# Patient Record
Sex: FEMALE | Employment: FULL TIME | URBAN - METROPOLITAN AREA
[De-identification: names, ages, dates, MRNs, and addresses within clinical notes are randomized per-mention and may not be internally consistent; named-entity substitution may affect disease eponyms.]

---

## 2021-07-13 ENCOUNTER — IMMUNIZATION (OUTPATIENT)
Dept: NURSING | Facility: CLINIC | Age: 41
End: 2021-07-13
Payer: COMMERCIAL

## 2021-07-13 PROCEDURE — 0001A PR COVID VAC PFIZER DIL RECON 30 MCG/0.3 ML IM: CPT

## 2021-07-13 PROCEDURE — 91300 PR COVID VAC PFIZER DIL RECON 30 MCG/0.3 ML IM: CPT

## 2021-08-03 ENCOUNTER — IMMUNIZATION (OUTPATIENT)
Dept: NURSING | Facility: CLINIC | Age: 41
End: 2021-08-03
Attending: FAMILY MEDICINE
Payer: COMMERCIAL

## 2021-08-03 PROCEDURE — 0002A PR COVID VAC PFIZER DIL RECON 30 MCG/0.3 ML IM: CPT

## 2021-08-03 PROCEDURE — 91300 PR COVID VAC PFIZER DIL RECON 30 MCG/0.3 ML IM: CPT

## 2022-05-12 ENCOUNTER — TRANSFERRED RECORDS (OUTPATIENT)
Dept: MULTI SPECIALTY CLINIC | Facility: CLINIC | Age: 42
End: 2022-05-12

## 2022-05-12 LAB — PAP SMEAR - HIM PATIENT REPORTED: NORMAL

## 2024-05-22 ENCOUNTER — LAB REQUISITION (OUTPATIENT)
Dept: LAB | Facility: CLINIC | Age: 44
End: 2024-05-22

## 2024-05-22 ENCOUNTER — HOSPITAL ENCOUNTER (OUTPATIENT)
Facility: CLINIC | Age: 44
Discharge: HOME | End: 2024-05-22
Admitting: PHYSICIAN ASSISTANT
Payer: COMMERCIAL

## 2024-05-22 DIAGNOSIS — B37.32 CHRONIC CANDIDIASIS OF VULVA AND VAGINA: ICD-10-CM

## 2024-05-22 DIAGNOSIS — Z13.1 ENCOUNTER FOR SCREENING FOR DIABETES MELLITUS: ICD-10-CM

## 2024-05-22 LAB — HBA1C MFR BLD: 5.6 %

## 2024-05-22 PROCEDURE — 87563 M. GENITALIUM AMP PROBE: CPT | Performed by: PHYSICIAN ASSISTANT

## 2024-05-22 PROCEDURE — 87102 FUNGUS ISOLATION CULTURE: CPT | Mod: ORL | Performed by: PHYSICIAN ASSISTANT

## 2024-05-22 PROCEDURE — 83036 HEMOGLOBIN GLYCOSYLATED A1C: CPT | Performed by: PHYSICIAN ASSISTANT

## 2024-05-25 LAB
M GENITALIUM DNA SPEC QL NAA+PROBE: NOT DETECTED
M HOMINIS DNA SPEC QL NAA+PROBE: DETECTED
U PARVUM DNA SPEC QL NAA+PROBE: DETECTED
U UREALYTICUM DNA SPEC QL NAA+PROBE: NOT DETECTED

## 2024-05-26 LAB — BACTERIA SPEC CULT: NO GROWTH

## 2024-06-18 ENCOUNTER — OFFICE VISIT (OUTPATIENT)
Dept: FAMILY MEDICINE | Facility: CLINIC | Age: 44
End: 2024-06-18
Payer: COMMERCIAL

## 2024-06-18 VITALS
SYSTOLIC BLOOD PRESSURE: 138 MMHG | TEMPERATURE: 97.2 F | DIASTOLIC BLOOD PRESSURE: 88 MMHG | OXYGEN SATURATION: 99 % | HEIGHT: 66 IN | WEIGHT: 161.4 LBS | BODY MASS INDEX: 25.94 KG/M2 | RESPIRATION RATE: 16 BRPM | HEART RATE: 87 BPM

## 2024-06-18 DIAGNOSIS — L03.317 CELLULITIS OF BUTTOCK: Primary | ICD-10-CM

## 2024-06-18 DIAGNOSIS — L50.9 URTICARIA: ICD-10-CM

## 2024-06-18 PROCEDURE — 99203 OFFICE O/P NEW LOW 30 MIN: CPT | Performed by: PHYSICIAN ASSISTANT

## 2024-06-18 RX ORDER — PREDNISONE 5 MG/1
TABLET ORAL
Qty: 42 TABLET | Refills: 0 | Status: SHIPPED | OUTPATIENT
Start: 2024-06-18

## 2024-06-18 RX ORDER — TRIAMCINOLONE ACETONIDE 1 MG/G
CREAM TOPICAL 2 TIMES DAILY
Qty: 80 G | Refills: 0 | Status: SHIPPED | OUTPATIENT
Start: 2024-06-18

## 2024-06-18 RX ORDER — CEPHALEXIN 500 MG/1
500 CAPSULE ORAL 3 TIMES DAILY
Qty: 30 CAPSULE | Refills: 0 | Status: SHIPPED | OUTPATIENT
Start: 2024-06-18 | End: 2024-06-28

## 2024-06-18 RX ORDER — CETIRIZINE HYDROCHLORIDE 10 MG/1
10 TABLET ORAL DAILY
Qty: 30 TABLET | Refills: 0 | Status: SHIPPED | OUTPATIENT
Start: 2024-06-18

## 2024-06-18 NOTE — PROGRESS NOTES
"  Assessment & Plan   Problem List Items Addressed This Visit    None  Visit Diagnoses       Cellulitis of buttock    -  Primary    Relevant Medications    cephALEXin (KEFLEX) 500 MG capsule    predniSONE (DELTASONE) 5 MG tablet    Urticaria        Relevant Medications    predniSONE (DELTASONE) 5 MG tablet    triamcinolone (KENALOG) 0.1 % external cream    cetirizine (ZYRTEC) 10 MG tablet        Cellulitis at the insect bite sites  Keflex 500 mg three times a day for 10 days    Urticaria- most likely systemic reaction to insect bites, severe excoriation of the area and cellulitis    Prednisone taper - see order  Triamcinolone cream apply twice a day to the areas aof itchy hives  Zyrtec 10 mg daily for 2-4 weeks   May take benadryl at bedtime as needed     BMI  Estimated body mass index is 26.05 kg/m  as calculated from the following:    Height as of this encounter: 1.676 m (5' 6\").    Weight as of this encounter: 73.2 kg (161 lb 6.4 oz).         Claudio Marrero is a 44 year old, presenting for the following health issues:  Cellulitis (Or bug bite upper thigh or buttock area )        6/18/2024     9:13 AM   Additional Questions   Roomed by aman     Via the Health Maintenance questionnaire, the patient has reported the following services have been completed -Cervical Cancer Screening: [facility] 2022-05-12, this information has been sent to the abstraction team.  History of Present Illness       Reason for visit:  Insect bite that looks like cellulitis  Symptom onset:  3-7 days ago  Symptoms include:  Insecr bites rash redness to skin pruritis  Symptom intensity:  Severe  Symptom progression:  Worsening  Had these symptoms before:  No  What makes it worse:  Seem to be more itchy as time progresses  What makes it better:  Cold press and itching    She eats 2-3 servings of fruits and vegetables daily.She consumes 0 sweetened beverage(s) daily.She exercises with enough effort to increase her heart rate 20 to 29 " "minutes per day.  She exercises with enough effort to increase her heart rate 5 days per week.   She is taking medications regularly.         Concern - insect bites that got red and painful  Onset: 6 days ago got 4-10 bites  Description: patient was scratching very hard, broke the skin, then 3 days ago started to develop severe surrounding redness and soreness  Intensity: moderate  Progression of Symptoms:  worsening  Accompanying Signs & Symptoms: 24 hours ago started to get hives all over the body, very itchy - bumps and blisters on both arms, legs, and torso  Previous history of similar problem: was in nature biking and also found mosquito at home  Precipitating factors:        Worsened by: itching  Alleviating factors:        Improved by: none  Therapies tried and outcome: calamine lotion        Review of Systems  Constitutional, HEENT, cardiovascular, pulmonary, gi and gu systems are negative, except as otherwise noted.      Objective    /88   Pulse 87   Temp 97.2  F (36.2  C) (Oral)   Resp 16   Ht 1.676 m (5' 6\")   Wt 73.2 kg (161 lb 6.4 oz)   LMP 06/07/2024   SpO2 99%   BMI 26.05 kg/m    Body mass index is 26.05 kg/m .    Physical Exam   GENERAL: alert and no distress  HENT: normal cephalic/atraumatic, nose and mouth without ulcers or lesions, oropharynx clear/patent, and oral mucous membranes moist  SKIN: right buttock and upper tight 2 large areas of erythema, swelling, warm to tough, slightly tender, severe excoriation marks, open skin with healing superficial wounds healing by crusting over  On the bilateral arms, legs and posterior right torso-urticarial wheals, excoriation marks            Signed Electronically by: Shanthi Patel PA-C    "

## 2024-06-18 NOTE — PATIENT INSTRUCTIONS
At [facility], we strive to deliver an exceptional experience to you, every time we see you. If you receive a survey, please complete it as we do value your feedback.  If you have MyChart, you can expect to receive results automatically within 24 hours of their completion.  Your provider will send a note interpreting your results as well.   If you do not have MyChart, you should receive your results in about a week by mail.    Your care team:                            Family Medicine Internal Medicine   MD Jairo Craven, MD Ayaka Franco, MD Summer Verma, PA-C    Artur Beverly, MD Pediatrics   Adan Silva, PA-C  Yadi Odonnell, MD Rena Carrizales APRODALYS Luke CNP, CNP, MD Radha Fay, MD Tova Leon, CNP  Same-Day (No follow up visit)   George Thao, IRENA Santos, PA-C    Azalia Mercer PA-C     Clinic hours: Monday - Thursday 7 am-6 pm; Fridays 7 am-5 pm.   Urgent care: Monday - Friday 10 am- 8 pm; Saturday and Sunday 9 am-5 pm.    Clinic: [phone]       [facility]: Monday - Thursday 8 am - 7 pm; Friday 8 am - 6 pm  [facility]: [phone]

## 2024-12-06 ENCOUNTER — ANCILLARY PROCEDURE (OUTPATIENT)
Dept: MAMMOGRAPHY | Facility: CLINIC | Age: 44
End: 2024-12-06
Payer: COMMERCIAL

## 2024-12-06 DIAGNOSIS — Z12.31 VISIT FOR SCREENING MAMMOGRAM: ICD-10-CM

## 2024-12-06 PROCEDURE — 77063 BREAST TOMOSYNTHESIS BI: CPT | Mod: TC | Performed by: RADIOLOGY

## 2024-12-06 PROCEDURE — 77067 SCR MAMMO BI INCL CAD: CPT | Mod: TC | Performed by: RADIOLOGY

## 2025-04-22 ENCOUNTER — PATIENT OUTREACH (OUTPATIENT)
Dept: CARE COORDINATION | Facility: CLINIC | Age: 45
End: 2025-04-22
Payer: COMMERCIAL

## 2025-05-06 ENCOUNTER — PATIENT OUTREACH (OUTPATIENT)
Dept: CARE COORDINATION | Facility: CLINIC | Age: 45
End: 2025-05-06
Payer: COMMERCIAL

## 2025-06-28 ENCOUNTER — HEALTH MAINTENANCE LETTER (OUTPATIENT)
Age: 45
End: 2025-06-28